# Patient Record
Sex: FEMALE | Race: ASIAN | NOT HISPANIC OR LATINO
[De-identification: names, ages, dates, MRNs, and addresses within clinical notes are randomized per-mention and may not be internally consistent; named-entity substitution may affect disease eponyms.]

---

## 2019-01-13 PROBLEM — Z00.00 ENCOUNTER FOR PREVENTIVE HEALTH EXAMINATION: Status: ACTIVE | Noted: 2019-01-13

## 2019-02-04 ENCOUNTER — RECORD ABSTRACTING (OUTPATIENT)
Age: 39
End: 2019-02-04

## 2019-02-04 DIAGNOSIS — Z87.898 PERSONAL HISTORY OF OTHER SPECIFIED CONDITIONS: ICD-10-CM

## 2019-02-04 DIAGNOSIS — H40.9 UNSPECIFIED GLAUCOMA: ICD-10-CM

## 2019-02-04 DIAGNOSIS — E66.9 OBESITY, UNSPECIFIED: ICD-10-CM

## 2019-02-04 DIAGNOSIS — Q85.8 OTHER PHAKOMATOSES, NOT ELSEWHERE CLASSIFIED: ICD-10-CM

## 2019-02-04 LAB — CYTOLOGY CVX/VAG DOC THIN PREP: NORMAL

## 2019-02-04 RX ORDER — IBUPROFEN 200 MG/1
200 TABLET, COATED ORAL
Refills: 0 | Status: ACTIVE | COMMUNITY

## 2019-02-04 RX ORDER — OMEGA-3/DHA/EPA/FISH OIL 300-1000MG
1000 CAPSULE ORAL
Refills: 0 | Status: ACTIVE | COMMUNITY

## 2019-02-04 RX ORDER — LEVETIRACETAM 500 MG/1
500 TABLET, FILM COATED, EXTENDED RELEASE ORAL
Refills: 0 | Status: ACTIVE | COMMUNITY

## 2019-02-04 RX ORDER — MULTIVIT-MIN/IRON/FOLIC ACID/K 18-600-40
500 CAPSULE ORAL
Refills: 0 | Status: ACTIVE | COMMUNITY

## 2019-02-04 RX ORDER — MULTIVITAMIN
CAPSULE ORAL
Refills: 0 | Status: ACTIVE | COMMUNITY

## 2019-02-04 RX ORDER — PHENOBARBITAL 97.2 MG/1
97.2 TABLET ORAL
Refills: 0 | Status: ACTIVE | COMMUNITY

## 2019-02-04 RX ORDER — GLUCOSAMINE HCL/CHONDROITIN SU 500-400 MG
3 CAPSULE ORAL
Refills: 0 | Status: ACTIVE | COMMUNITY

## 2019-02-12 ENCOUNTER — APPOINTMENT (OUTPATIENT)
Dept: OBGYN | Facility: CLINIC | Age: 39
End: 2019-02-12

## 2019-03-20 ENCOUNTER — APPOINTMENT (OUTPATIENT)
Dept: OBGYN | Facility: CLINIC | Age: 39
End: 2019-03-20
Payer: MEDICARE

## 2019-03-20 VITALS
DIASTOLIC BLOOD PRESSURE: 64 MMHG | BODY MASS INDEX: 27.94 KG/M2 | SYSTOLIC BLOOD PRESSURE: 118 MMHG | WEIGHT: 148 LBS | HEIGHT: 61 IN

## 2019-03-20 PROCEDURE — G0101: CPT

## 2020-03-25 ENCOUNTER — APPOINTMENT (OUTPATIENT)
Dept: OBGYN | Facility: CLINIC | Age: 40
End: 2020-03-25

## 2020-09-02 ENCOUNTER — APPOINTMENT (OUTPATIENT)
Dept: OBGYN | Facility: CLINIC | Age: 40
End: 2020-09-02
Payer: MEDICARE

## 2020-09-02 VITALS
SYSTOLIC BLOOD PRESSURE: 114 MMHG | WEIGHT: 151 LBS | BODY MASS INDEX: 28.51 KG/M2 | DIASTOLIC BLOOD PRESSURE: 68 MMHG | HEIGHT: 61 IN

## 2020-09-02 DIAGNOSIS — R92.2 INCONCLUSIVE MAMMOGRAM: ICD-10-CM

## 2020-09-02 DIAGNOSIS — Z12.31 ENCOUNTER FOR SCREENING MAMMOGRAM FOR MALIGNANT NEOPLASM OF BREAST: ICD-10-CM

## 2020-09-02 PROCEDURE — G0101: CPT

## 2020-10-29 ENCOUNTER — RESULT REVIEW (OUTPATIENT)
Age: 40
End: 2020-10-29

## 2021-09-09 ENCOUNTER — APPOINTMENT (OUTPATIENT)
Dept: OBGYN | Facility: CLINIC | Age: 41
End: 2021-09-09
Payer: MEDICARE

## 2021-09-09 VITALS
BODY MASS INDEX: 28.89 KG/M2 | WEIGHT: 153 LBS | DIASTOLIC BLOOD PRESSURE: 64 MMHG | SYSTOLIC BLOOD PRESSURE: 112 MMHG | HEIGHT: 61 IN

## 2021-09-09 PROCEDURE — G0101: CPT

## 2021-09-09 NOTE — HISTORY OF PRESENT ILLNESS
[FreeTextEntry1] : 41yo  LMP 21  here for annual Gyn exam.Menses usually  regular ~ 28(+/- 5d) day cycles lasting 5 days . Pt is virginal. Has no c/o vaginal discharge. Has occasional "loose stool" . Currently has "frozen" left shoulder, going to PT. [Mammogramdate] : 10/29/20 [TextBox_19] : BIRADS 2D [BreastSonogramDate] : 10/29/20 [TextBox_25] : BIRADS 2D [PapSmeardate] : 12/17/15 [TextBox_31] : Neg/HPV Neg

## 2021-09-12 LAB — HPV HIGH+LOW RISK DNA PNL CVX: NOT DETECTED

## 2021-09-14 LAB — CYTOLOGY CVX/VAG DOC THIN PREP: NORMAL

## 2021-12-27 ENCOUNTER — RESULT REVIEW (OUTPATIENT)
Age: 41
End: 2021-12-27

## 2022-12-08 ENCOUNTER — APPOINTMENT (OUTPATIENT)
Dept: OBGYN | Facility: CLINIC | Age: 42
End: 2022-12-08

## 2022-12-08 VITALS
SYSTOLIC BLOOD PRESSURE: 120 MMHG | DIASTOLIC BLOOD PRESSURE: 80 MMHG | WEIGHT: 153 LBS | BODY MASS INDEX: 28.89 KG/M2 | HEIGHT: 61 IN

## 2022-12-08 PROCEDURE — G0101: CPT

## 2022-12-08 RX ORDER — CYCLOPENTOLATE HYDROCHLORIDE 10 MG/ML
1 SOLUTION OPHTHALMIC
Refills: 0 | Status: COMPLETED | COMMUNITY
End: 2022-12-08

## 2022-12-08 RX ORDER — CARBAMAZEPINE 200 MG/1
200 CAPSULE, EXTENDED RELEASE ORAL
Refills: 0 | Status: COMPLETED | COMMUNITY
End: 2022-12-08

## 2022-12-08 RX ORDER — ACETAMINOPHEN 500 MG
50 TABLET ORAL
Qty: 100 | Refills: 0 | Status: ACTIVE | COMMUNITY
Start: 2022-08-05

## 2022-12-08 RX ORDER — GABAPENTIN 100 MG/1
100 CAPSULE ORAL
Refills: 0 | Status: COMPLETED | COMMUNITY
End: 2022-12-08

## 2022-12-08 RX ORDER — SALINE NASAL SPRAY 1.5 OZ
0.65 SOLUTION NASAL
Qty: 44 | Refills: 0 | Status: ACTIVE | COMMUNITY
Start: 2022-08-05

## 2022-12-08 RX ORDER — MULTIVIT-MIN/FA/LYCOPEN/LUTEIN .4-300-25
TABLET ORAL
Qty: 60 | Refills: 0 | Status: ACTIVE | COMMUNITY
Start: 2022-08-05

## 2022-12-08 RX ORDER — AZELASTINE HYDROCHLORIDE 137 UG/1
0.1 SPRAY, METERED NASAL
Qty: 30 | Refills: 0 | Status: ACTIVE | COMMUNITY
Start: 2022-09-28

## 2022-12-08 RX ORDER — CALCIUM CARBONATE/VITAMIN D3 500 MG-10
500-400 TABLET ORAL
Refills: 0 | Status: COMPLETED | COMMUNITY
End: 2022-12-08

## 2022-12-08 RX ORDER — AMMONIUM LACTATE 12 %
12 CREAM (GRAM) TOPICAL
Qty: 140 | Refills: 0 | Status: ACTIVE | COMMUNITY
Start: 2022-10-05

## 2022-12-08 RX ORDER — TROPICAMIDE 10 MG/ML
1 SOLUTION/ DROPS OPHTHALMIC
Qty: 3 | Refills: 0 | Status: ACTIVE | COMMUNITY
Start: 2022-04-01

## 2022-12-08 RX ORDER — FLUTICASONE PROPIONATE 50 UG/1
50 SPRAY, METERED NASAL
Qty: 16 | Refills: 0 | Status: ACTIVE | COMMUNITY
Start: 2022-08-24

## 2022-12-08 RX ORDER — PHENYLEPHRINE HYDROCHLORIDE 25 MG/ML
2.5 SOLUTION/ DROPS OPHTHALMIC
Refills: 0 | Status: COMPLETED | COMMUNITY
End: 2022-12-08

## 2022-12-08 RX ORDER — FAMOTIDINE 40 MG/1
40 TABLET, FILM COATED ORAL
Qty: 30 | Refills: 0 | Status: ACTIVE | COMMUNITY
Start: 2022-06-22

## 2022-12-08 RX ORDER — OLOPATADINE HYDROCHLORIDE 7 MG/ML
0.7 SOLUTION OPHTHALMIC
Qty: 2 | Refills: 0 | Status: ACTIVE | COMMUNITY
Start: 2022-04-01

## 2022-12-08 RX ORDER — GUAIFENESIN 100 MG/5ML
100 LIQUID ORAL
Refills: 0 | Status: COMPLETED | COMMUNITY
End: 2022-12-08

## 2022-12-08 RX ORDER — ASCORBIC ACID 500 MG
500 TABLET ORAL
Qty: 100 | Refills: 0 | Status: ACTIVE | COMMUNITY
Start: 2022-08-05

## 2022-12-08 RX ORDER — LEVETIRACETAM 750 MG/1
750 TABLET, FILM COATED, EXTENDED RELEASE ORAL
Refills: 0 | Status: COMPLETED | COMMUNITY
End: 2022-12-08

## 2022-12-08 RX ORDER — LEVETIRACETAM 750 MG/1
750 TABLET, EXTENDED RELEASE ORAL
Refills: 0 | Status: COMPLETED | COMMUNITY
End: 2022-12-08

## 2022-12-08 RX ORDER — CARBAMAZEPINE 300 MG/1
300 CAPSULE, EXTENDED RELEASE ORAL
Qty: 30 | Refills: 0 | Status: ACTIVE | COMMUNITY
Start: 2022-08-22

## 2022-12-08 RX ORDER — DORZOLAMIDE HYDROCHLORIDE TIMOLOL MALEATE 20; 5 MG/ML; MG/ML
22.3-6.8 SOLUTION/ DROPS OPHTHALMIC
Qty: 10 | Refills: 0 | Status: ACTIVE | COMMUNITY
Start: 2022-06-06

## 2022-12-08 RX ORDER — BRIMONIDINE TARTRATE 2 MG/MG
0.2 SOLUTION/ DROPS OPHTHALMIC
Qty: 10 | Refills: 0 | Status: ACTIVE | COMMUNITY
Start: 2022-08-26

## 2022-12-08 NOTE — HISTORY OF PRESENT ILLNESS
[FreeTextEntry1] : 41yo  LMP 22  here for annual Gyn exam.Menses usually regular ~ 28(+/- 5d) day cycles lasting 5 days .  Most recent period came without warning but was normal in amount and duration.Pt is virginal. Has no c/o vaginal discharge. [Mammogramdate] : 12/27/21 [TextBox_19] : BIRADS 1D [BreastSonogramDate] : 12/27/21 [TextBox_25] : BIRADS 1D [PapSmeardate] : 9/9/21 [TextBox_31] : Neg/HPV Neg [BoneDensityDate] : 2015 [TextBox_37] : osteopenic

## 2023-01-18 ENCOUNTER — RESULT REVIEW (OUTPATIENT)
Age: 43
End: 2023-01-18

## 2024-01-24 ENCOUNTER — APPOINTMENT (OUTPATIENT)
Dept: OBGYN | Facility: CLINIC | Age: 44
End: 2024-01-24
Payer: MEDICARE

## 2024-01-24 ENCOUNTER — LABORATORY RESULT (OUTPATIENT)
Age: 44
End: 2024-01-24

## 2024-01-24 VITALS
HEIGHT: 61 IN | SYSTOLIC BLOOD PRESSURE: 110 MMHG | WEIGHT: 150 LBS | DIASTOLIC BLOOD PRESSURE: 70 MMHG | BODY MASS INDEX: 28.32 KG/M2

## 2024-01-24 DIAGNOSIS — N91.2 AMENORRHEA, UNSPECIFIED: ICD-10-CM

## 2024-01-24 DIAGNOSIS — Z01.419 ENCOUNTER FOR GYNECOLOGICAL EXAMINATION (GENERAL) (ROUTINE) W/OUT ABNORMAL FINDINGS: ICD-10-CM

## 2024-01-24 DIAGNOSIS — N95.1 MENOPAUSAL AND FEMALE CLIMACTERIC STATES: ICD-10-CM

## 2024-01-24 PROCEDURE — 76856 US EXAM PELVIC COMPLETE: CPT

## 2024-01-24 PROCEDURE — 99396 PREV VISIT EST AGE 40-64: CPT | Mod: GY

## 2024-01-24 NOTE — HISTORY OF PRESENT ILLNESS
[FreeTextEntry1] : 39yo  LMP 22 here for annual Gyn exam.Menses have become irregular with last period in October or November. Mis has been having hot flashes and sweats(says she feels like she needs to take her shirt off and that she gets spasma).Pt is virginal. Has no c/o vaginal discharge.   Preventative Visit:   Mammogram: 23, BIRADS 2D.   Breast Sonogram: 23, BIRADS 2D.  PAP Smear: 21, Neg/HPV Neg.   Bone Density: 2015, osteopenic.

## 2024-01-24 NOTE — PROCEDURE
[Amenorrhea] : Amenorrhea [Anteverted] : anteverted [L: ___ cm] : L: [unfilled] cm [W: ___cm] : W: [unfilled] cm [H: ___ cm] : H: [unfilled] cm [de-identified] : Transabdominal Ultrasound [FreeTextEntry7] : 3.0 x 2.3cm [FreeTextEntry5] : Uniform EM 5.7mm [FreeTextEntry8] : 2.9 x 2.1cm [FreeTextEntry4] : Normal pelvic structures

## 2024-01-28 ENCOUNTER — NON-APPOINTMENT (OUTPATIENT)
Age: 44
End: 2024-01-28

## 2024-01-30 ENCOUNTER — NON-APPOINTMENT (OUTPATIENT)
Age: 44
End: 2024-01-30

## 2024-02-05 LAB
ESTRADIOL SERPL-MCNC: 84 PG/ML
FSH SERPL-MCNC: 7.8 IU/L
PROGEST SERPL-MCNC: 2 NG/ML

## 2024-04-04 ENCOUNTER — RESULT REVIEW (OUTPATIENT)
Age: 44
End: 2024-04-04